# Patient Record
Sex: FEMALE
[De-identification: names, ages, dates, MRNs, and addresses within clinical notes are randomized per-mention and may not be internally consistent; named-entity substitution may affect disease eponyms.]

---

## 2023-07-07 PROBLEM — Z00.00 ENCOUNTER FOR PREVENTIVE HEALTH EXAMINATION: Status: ACTIVE | Noted: 2023-07-07

## 2023-07-14 ENCOUNTER — APPOINTMENT (OUTPATIENT)
Dept: NEUROSURGERY | Facility: CLINIC | Age: 38
End: 2023-07-14
Payer: SELF-PAY

## 2023-07-14 VITALS — BODY MASS INDEX: 30.05 KG/M2 | HEIGHT: 64 IN | WEIGHT: 176 LBS

## 2023-07-14 PROCEDURE — 99203 OFFICE O/P NEW LOW 30 MIN: CPT

## 2023-07-14 NOTE — HISTORY OF PRESENT ILLNESS
[de-identified] : Ms. Ortega is a 38yr old female who presents today for a second opinion, s/p decompression for Trigeminal neuralgia done in [facility] on 2/2021. She was first diagnosed in 2021, after the reversal of lap band surgery, she noticed pain on the right side of her face radiating from her ear to down the jaw line ( RV3). The pain is worse when eating, but can occur spontaneous, and can last hours.\par  She saw ENT doctor who referred to the dentist, and had her old teeth refill, though pain persisted. SHe was refer to neurology, and suggested pain may be due to the 5th cranial nerve, and recommended surgery. \par However after surgery, pain progressed. She tried and failed tizanidine, tegretol 200mg, baclofen, celebrex. Had a cortisone shot by a pain management provider to the 9 cranial nerves, which failed,. \par Currrently on tramadol and cymbalta which lessen the pain. \par \par

## 2023-07-14 NOTE — PLAN
[FreeTextEntry1] : I am ordering an MRI of head w/w/o contrast, FIESTA sequence, thin cuts at T2 to assess the trigeminal nerve root. She will follow up with me once it is completed so I can delineate a plan of care. She is agreeable to this. Gamma knife may be considered.

## 2023-07-23 ENCOUNTER — OUTPATIENT (OUTPATIENT)
Dept: OUTPATIENT SERVICES | Facility: HOSPITAL | Age: 38
LOS: 1 days | End: 2023-07-23
Payer: COMMERCIAL

## 2023-07-23 DIAGNOSIS — G50.0 TRIGEMINAL NEURALGIA: ICD-10-CM

## 2023-07-23 DIAGNOSIS — R42 DIZZINESS AND GIDDINESS: ICD-10-CM

## 2023-07-23 PROCEDURE — A9579: CPT

## 2023-07-23 PROCEDURE — 70553 MRI BRAIN STEM W/O & W/DYE: CPT | Mod: 26

## 2023-07-23 PROCEDURE — 70553 MRI BRAIN STEM W/O & W/DYE: CPT

## 2023-07-24 DIAGNOSIS — R42 DIZZINESS AND GIDDINESS: ICD-10-CM

## 2023-07-24 DIAGNOSIS — G50.0 TRIGEMINAL NEURALGIA: ICD-10-CM

## 2023-08-03 ENCOUNTER — APPOINTMENT (OUTPATIENT)
Dept: NEUROSURGERY | Facility: CLINIC | Age: 38
End: 2023-08-03
Payer: COMMERCIAL

## 2023-08-03 VITALS
TEMPERATURE: 97.5 F | OXYGEN SATURATION: 100 % | HEIGHT: 64 IN | WEIGHT: 176 LBS | RESPIRATION RATE: 16 BRPM | DIASTOLIC BLOOD PRESSURE: 50 MMHG | HEART RATE: 97 BPM | BODY MASS INDEX: 30.05 KG/M2 | SYSTOLIC BLOOD PRESSURE: 105 MMHG

## 2023-08-03 PROCEDURE — 99212 OFFICE O/P EST SF 10 MIN: CPT

## 2023-08-09 ENCOUNTER — APPOINTMENT (OUTPATIENT)
Dept: NEUROSURGERY | Facility: CLINIC | Age: 38
End: 2023-08-09
Payer: COMMERCIAL

## 2023-08-09 VITALS — HEIGHT: 64 IN | BODY MASS INDEX: 30.05 KG/M2 | WEIGHT: 176 LBS

## 2023-08-09 PROCEDURE — 99213 OFFICE O/P EST LOW 20 MIN: CPT

## 2023-08-15 ENCOUNTER — OUTPATIENT (OUTPATIENT)
Dept: OUTPATIENT SERVICES | Facility: HOSPITAL | Age: 38
LOS: 1 days | Discharge: HOME | End: 2023-08-15
Payer: MEDICAID

## 2023-08-21 ENCOUNTER — APPOINTMENT (OUTPATIENT)
Dept: RADIATION ONCOLOGY | Facility: CLINIC | Age: 38
End: 2023-08-21
Payer: COMMERCIAL

## 2023-08-21 DIAGNOSIS — Z87.898 PERSONAL HISTORY OF OTHER SPECIFIED CONDITIONS: ICD-10-CM

## 2023-08-21 DIAGNOSIS — Z80.1 FAMILY HISTORY OF MALIGNANT NEOPLASM OF TRACHEA, BRONCHUS AND LUNG: ICD-10-CM

## 2023-08-21 DIAGNOSIS — Z72.89 OTHER PROBLEMS RELATED TO LIFESTYLE: ICD-10-CM

## 2023-08-21 DIAGNOSIS — Z78.9 OTHER SPECIFIED HEALTH STATUS: ICD-10-CM

## 2023-08-21 DIAGNOSIS — F17.210 NICOTINE DEPENDENCE, CIGARETTES, UNCOMPLICATED: ICD-10-CM

## 2023-08-21 PROCEDURE — 99024 POSTOP FOLLOW-UP VISIT: CPT

## 2023-08-21 RX ORDER — DULOXETINE HYDROCHLORIDE 30 MG/1
30 CAPSULE, DELAYED RELEASE ORAL
Refills: 0 | Status: ACTIVE | COMMUNITY

## 2023-08-21 NOTE — HISTORY OF PRESENT ILLNESS
[FreeTextEntry1] : This is a 39 y/o F who comes to discuss the role of radiation therapy in her care. Accompanied by her nephew Zeb who she  Reports in 2020 she noticed the onset of random right facial pain from the ear to the cheek/jaw to throat. recall this occurred 2 weeks after an endoscopic removal of a gastric sleeve/lap band. Pain was described as "throbbing" aggravated by chewing/talking/touching the face. The facial pain progressively worsened despite seeking treatment from a dentist, PCP and ENT. Per patient she was treated with Tegretol, Lyrica, Baclofen with poor results for symptoms improved.  Patient  s/p MVD in [place] February 2021 for trigeminal neuralgia. Per patient the procedure seemed to have worsened her symptoms "the pain is stronger than before the surgery". She has been here since June of 2023 and has not established care with a neurologist but has undergone a neurosurgical consultation the Dr. Akers who after patient completed images recommended for RT consideration. Reports the number of episodes per day varies but she is never w/o pain despite her current regimen Tramadol/ Baclofen/Cymbalta (ordered by MD in [place]). The pain radiated from the ear to the RIGHT jaw (V2), worse with eating/eating or at times occurred at rest, which is affecting her quality of life  MRI brain w w/o contrast 7/23/2023 IMPRESSION: 1. Patient is status post right retrosigmoid craniotomy. Tiny focus of linear signal abnormality along the lateral aspect of the entry zone of the right trigeminal nerve (ser 9 im 47) likely reflecting postsurgical change. 2. Otherwise unremarkable MRI appearance of bilateral trigeminal nerves with no evidence of vascular impingement.  Denies facial weakness/numbness. Surgical incision site per patient healed but still a little numbness in the area.

## 2023-08-21 NOTE — VITALS
[Maximal Pain Intensity: 10/10] : 10/10 [Least Pain Intensity: 7/10] : 7/10 [100: Normal, no complaints, no evidence of disease.] : 100: Normal, no complaints, no evidence of disease. [Date: ____________] : Patient's last distress assessment performed on [unfilled]. [5 - Distress Level] : Distress Level: 5

## 2023-08-21 NOTE — PHYSICAL EXAM
[General Appearance - Well Developed] : well developed [de-identified] : Limited d/t visit being TEB

## 2023-08-28 ENCOUNTER — APPOINTMENT (OUTPATIENT)
Dept: NEUROSURGERY | Facility: AMBULATORY SURGERY CENTER | Age: 38
End: 2023-08-28
Payer: MEDICAID

## 2023-08-28 ENCOUNTER — OUTPATIENT (OUTPATIENT)
Dept: OUTPATIENT SERVICES | Facility: HOSPITAL | Age: 38
LOS: 1 days | End: 2023-08-28
Payer: SELF-PAY

## 2023-08-28 ENCOUNTER — OUTPATIENT (OUTPATIENT)
Dept: OUTPATIENT SERVICES | Facility: HOSPITAL | Age: 38
LOS: 1 days | End: 2023-08-28

## 2023-08-28 ENCOUNTER — APPOINTMENT (OUTPATIENT)
Dept: MRI IMAGING | Facility: IMAGING CENTER | Age: 38
End: 2023-08-28

## 2023-08-28 ENCOUNTER — APPOINTMENT (OUTPATIENT)
Dept: NEUROSURGERY | Facility: AMBULATORY SURGERY CENTER | Age: 38
End: 2023-08-28

## 2023-08-28 DIAGNOSIS — G50.0 TRIGEMINAL NEURALGIA: ICD-10-CM

## 2023-08-28 PROCEDURE — 77432 STEREOTACTIC RADIATION TRMT: CPT

## 2023-08-28 PROCEDURE — A9585: CPT

## 2023-08-28 PROCEDURE — 77295 3-D RADIOTHERAPY PLAN: CPT | Mod: 26

## 2023-08-28 PROCEDURE — 61796 SRS CRANIAL LESION SIMPLE: CPT | Mod: 1L

## 2023-08-28 PROCEDURE — 61800 APPLY SRS HEADFRAME ADD-ON: CPT | Mod: 1L

## 2023-08-28 PROCEDURE — 77300 RADIATION THERAPY DOSE PLAN: CPT | Mod: 26

## 2023-08-28 PROCEDURE — 76498 UNLISTED MR PROCEDURE: CPT

## 2023-08-28 RX ORDER — IBUPROFEN 400 MG/1
400 TABLET, FILM COATED ORAL
Qty: 0 | Refills: 0 | Status: COMPLETED | OUTPATIENT
Start: 2023-08-28

## 2023-08-28 RX ORDER — LIDOCAINE HYDROCHLORIDE 20 MG/ML
2 INJECTION, SOLUTION INFILTRATION; PERINEURAL
Qty: 0 | Refills: 0 | Status: COMPLETED | OUTPATIENT
Start: 2023-08-28

## 2023-08-28 RX ORDER — ACETAMINOPHEN 10 MG/ML
1000 INJECTION INTRAVENOUS
Qty: 0 | Refills: 0 | Status: COMPLETED | OUTPATIENT
Start: 2023-08-28

## 2023-08-28 RX ORDER — BACLOFEN 10 MG/1
10 TABLET ORAL
Refills: 0 | Status: ACTIVE | COMMUNITY

## 2023-09-01 ENCOUNTER — NON-APPOINTMENT (OUTPATIENT)
Age: 38
End: 2023-09-01

## 2023-09-08 ENCOUNTER — APPOINTMENT (OUTPATIENT)
Dept: NEUROSURGERY | Facility: CLINIC | Age: 38
End: 2023-09-08
Payer: SUBSIDIZED

## 2023-09-08 VITALS — WEIGHT: 176 LBS | BODY MASS INDEX: 30.05 KG/M2 | HEIGHT: 64 IN

## 2023-09-08 PROCEDURE — 99213 OFFICE O/P EST LOW 20 MIN: CPT

## 2023-09-08 RX ORDER — TRAMADOL HYDROCHLORIDE 50 MG/1
50 TABLET, COATED ORAL
Qty: 28 | Refills: 0 | Status: COMPLETED | COMMUNITY
Start: 2023-09-08 | End: 2023-09-22

## 2023-09-08 RX ORDER — TRAMADOL HYDROCHLORIDE 50 MG/1
50 TABLET, COATED ORAL
Refills: 0 | Status: DISCONTINUED | COMMUNITY
End: 2023-09-08

## 2023-09-08 NOTE — PLAN
[TextEntry] : Patient is going to return back to the [place] today but was educated that a MRI head with and without IV contrast is needed in 6 months post gamma knife stereotactic radiation as a follow-up, prescription provided  Tramadol refilled  Patient is to follow-up after the 6-month post GKSR MRI is performed   No concerns were identified during the visit and all questions were answered  *Dr. Cordero agreed to abovementioned plan, reviewed prior

## 2023-09-08 NOTE — HISTORY OF PRESENT ILLNESS
[FreeTextEntry1] : Ms. VILLA is a 38-year-old female presenting to the neurosurgery office today alongside 2 family members status post gamma knife stereotactic radiation performed on 8/28/2023 for right trigeminal neuralgia, follow-up.  Niece (Bret) is present and translating for the patient as per her preference. Patient speaks little English. She is overall doing well today stating that she is no longer experiencing a sharp shooting pain to the right side of her face but has residual right ear pain. She has not taken her Tramadol due to a prescribing issue since the procedure but will be sent a refill for such today, to take as needed. Patient is traveling back to [place] today. She was educated that an MRI of the head with and without IV contrast is needed in 6 months as a follow-up and a prescription was provided to the patient as per her request in the event that she is not able to [place] and she will have this performed in her home country.  Patient was advised that imaging and/or a report of the MRI be sent to our office for review after completion.  She verbalized understanding.  As for the left-sided ear pain, patient was informed that the full effects of the stereotactic radiation can take up to 3 months from treatment and at times up to 6 months, for each patient is individualized.  She should not be discouraged at this time. Patient denies any headaches, blurred vision, dizziness, one-sided weakness or gait disturbances.  She was told once more, as she was in a prior visit, that reactions to the surrounding brain can manifest up to 6 months post treatment and ultimately the patient should seek medical attention should this occur.   All 4 pin sites are unidentifiable, fully healed.  No concerns were identified today and the patient and her family were grateful for the visit.

## 2023-09-15 ENCOUNTER — NON-APPOINTMENT (OUTPATIENT)
Age: 38
End: 2023-09-15

## 2023-10-23 PROBLEM — Z00.00 ENCOUNTER FOR PREVENTIVE HEALTH EXAMINATION: Status: ACTIVE | Noted: 2023-10-23

## 2023-12-18 ENCOUNTER — NON-APPOINTMENT (OUTPATIENT)
Age: 38
End: 2023-12-18

## 2023-12-21 ENCOUNTER — APPOINTMENT (OUTPATIENT)
Dept: RADIATION ONCOLOGY | Facility: CLINIC | Age: 38
End: 2023-12-21

## 2023-12-21 ENCOUNTER — APPOINTMENT (OUTPATIENT)
Dept: RADIATION ONCOLOGY | Facility: CLINIC | Age: 38
End: 2023-12-21
Payer: SUBSIDIZED

## 2023-12-21 PROCEDURE — 99212 OFFICE O/P EST SF 10 MIN: CPT | Mod: 95

## 2023-12-21 NOTE — HISTORY OF PRESENT ILLNESS
[Home] : at home, [unfilled] , at the time of the visit. [Medical Office: ([facility])___] : at the medical office located in  [Verbal consent obtained from patient] : the patient, [unfilled] [Other Location: e.g. School (Enter Location, City,State)___] : at [unfilled], at the time of the visit. [FreeTextEntry1] : RT hx: SRS to RIGHT trigeminal nerve [id number] over 1 Fx 8/28/2023  This is a 37 y/o F who comes to discuss the role of radiation therapy in her care. Accompanied by her nephew Zeb who she  Reports in 2020 she noticed the onset of random right facial pain from the ear to the cheek/jaw to throat. recall this occurred 2 weeks after an endoscopic removal of a gastric sleeve/lap band. Pain was described as "throbbing" aggravated by chewing/talking/touching the face. The facial pain progressively worsened despite seeking treatment from a dentist, PCP and ENT. Per patient she was treated with Tegretol, Lyrica, Baclofen with poor results for symptoms improved.  Patient  s/p MVD in [place] February 2021 for trigeminal neuralgia. Per patient the procedure seemed to have worsened her symptoms "the pain is stronger than before the surgery". She has been here since June of 2023 and has not established care with a neurologist but has undergone a neurosurgical consultation the Dr. Akers who after patient completed images recommended for RT consideration. Reports the number of episodes per day varies but she is never w/o pain despite her current regimen Tramadol/ Baclofen/Cymbalta (ordered by MD in [place]). The pain radiated from the ear to the RIGHT jaw (V2), worse with eating/eating or at times occurred at rest, which is affecting her quality of life  MRI brain w w/o contrast 7/23/2023 IMPRESSION: 1. Patient is status post right retrosigmoid craniotomy. Tiny focus of linear signal abnormality along the lateral aspect of the entry zone of the right trigeminal nerve (ser 9 im 47) likely reflecting postsurgical change. 2. Otherwise unremarkable MRI appearance of bilateral trigeminal nerves with no evidence of vascular impingement.  Denies facial weakness/numbness. Surgical incision site per patient healed but still a little numbness in the area.  Visit dated 12/21/2023 Patient returns for routine f/u and progress check. Completed 86GY/1 Fx to RIGHT trigeminal nerve on 8/28/2023. Reports she received relief of RIGHT sided pain, but this was short lived reports only having symptom relief for 3 months but noticed that after been instructed to reduce the medication dose her pain recured and seemed to be worse. Tramadol, Baclofen, Cymbalta Since resuming the medication regimen her pain has improved  Denies facial numbness/weakness

## 2024-01-29 NOTE — HISTORY OF PRESENT ILLNESS
[FreeTextEntry1] : Ms. VILLA is a 38-year-old female presenting to the neurosurgery office as a follow-up for right-sided trigeminal neuralgia, niece (Bret) is present and translating for the patient as per her preference.  No recent imaging for review.  MRI Brain ([facility] 07/23/2023) 1. Patient is status post right retrosigmoid craniotomy. Tiny focus of linear signal abnormality along the lateral aspect of the entry zone of the right trigeminal nerve (ser 9 im 47) likely reflecting postsurgical change. 2. Otherwise unremarkable MRI appearance of bilateral trigeminal nerves with no evidence of vascular impingement.  Patient endorses that she has been experiencing a sharp shooting pain to the right side of her face for over 2 years. She underwent a microvascular decompression of the trigeminal nerve February 2021 back in the [place], [facility]. She had mild symptomatic relief for short period of time but since shortly after the procedure she has been on several oral agents including Tramadol and Baclofen, both of which she takes every day.   Stereotactic radiation in the form of gamma knife was presented to the patient and her family today. She was informed that the risks of cumulative effects long-term include possible manifestation from the radiation. Patient is aware that it is performed alongside Dr. Mathew in [place]. We went over the fact that side effects generally can manifest in about 3 months from treatment up to 6 months and could be delayed off that as well, for everyone is individualized. This could include reactions to the surrounding brain which may result in neurological deficits and or headaches and ultimately the patient should seek medical attention should this occur. Ms. VILLA reassured that she would not have difficulty either [place] or having the follow-up MRI post GKSR arranged back "home."  Given the risks, patient would like to proceed and will be scheduled accordingly.

## 2024-01-29 NOTE — END OF VISIT
[FreeTextEntry3] :  I, Dr Cordero personally performed the evaluation and management (E/M) services for this established patient who presents today with (a) new problem(s)/exacerbation of (an) existing condition(s).  That E/M includes conducting the examination, assessing all new/exacerbated conditions, and establishing a new plan of care.  Today, my LAXMI was here to observe my evaluation and management services for this new problem/exacerbated condition to be followed going forward.

## 2024-04-14 ENCOUNTER — RESULT REVIEW (OUTPATIENT)
Age: 39
End: 2024-04-14

## 2024-04-14 ENCOUNTER — OUTPATIENT (OUTPATIENT)
Dept: OUTPATIENT SERVICES | Facility: HOSPITAL | Age: 39
LOS: 1 days | End: 2024-04-14
Payer: COMMERCIAL

## 2024-04-14 DIAGNOSIS — Z00.8 ENCOUNTER FOR OTHER GENERAL EXAMINATION: ICD-10-CM

## 2024-04-14 DIAGNOSIS — G50.0 TRIGEMINAL NEURALGIA: ICD-10-CM

## 2024-04-14 PROCEDURE — 70553 MRI BRAIN STEM W/O & W/DYE: CPT | Mod: 26

## 2024-04-14 PROCEDURE — 70553 MRI BRAIN STEM W/O & W/DYE: CPT

## 2024-04-14 PROCEDURE — A9579: CPT

## 2024-04-15 DIAGNOSIS — G50.0 TRIGEMINAL NEURALGIA: ICD-10-CM

## 2024-04-16 ENCOUNTER — NON-APPOINTMENT (OUTPATIENT)
Age: 39
End: 2024-04-16

## 2024-04-19 ENCOUNTER — APPOINTMENT (OUTPATIENT)
Dept: NEUROSURGERY | Facility: CLINIC | Age: 39
End: 2024-04-19
Payer: COMMERCIAL

## 2024-04-19 VITALS
WEIGHT: 176 LBS | HEIGHT: 64 IN | SYSTOLIC BLOOD PRESSURE: 110 MMHG | BODY MASS INDEX: 30.05 KG/M2 | DIASTOLIC BLOOD PRESSURE: 65 MMHG

## 2024-04-19 VITALS — HEART RATE: 88 BPM

## 2024-04-19 DIAGNOSIS — G50.0 TRIGEMINAL NEURALGIA: ICD-10-CM

## 2024-04-19 DIAGNOSIS — Z92.3 PERSONAL HISTORY OF IRRADIATION: ICD-10-CM

## 2024-04-19 PROCEDURE — 99214 OFFICE O/P EST MOD 30 MIN: CPT

## 2024-04-19 RX ORDER — GABAPENTIN 100 MG/1
100 CAPSULE ORAL 3 TIMES DAILY
Qty: 90 | Refills: 0 | Status: COMPLETED | COMMUNITY
Start: 2024-04-19 | End: 2024-05-19

## 2024-04-19 RX ORDER — METHYLPREDNISOLONE 4 MG/1
4 TABLET ORAL
Qty: 1 | Refills: 0 | Status: ACTIVE | COMMUNITY
Start: 2024-04-19 | End: 1900-01-01

## 2024-04-19 RX ORDER — FAMOTIDINE 40 MG/1
40 TABLET, FILM COATED ORAL DAILY
Qty: 6 | Refills: 0 | Status: COMPLETED | COMMUNITY
Start: 2024-04-19 | End: 2024-04-25

## 2024-05-14 ENCOUNTER — APPOINTMENT (OUTPATIENT)
Dept: OTOLARYNGOLOGY | Facility: CLINIC | Age: 39
End: 2024-05-14

## 2024-06-21 NOTE — ASSESSMENT
[FreeTextEntry1] : 39y F status post GKSR on 8/28/2023. Overall doing well, complaining of more numbness to the right side of her face than pain. New MRI reviewed. Gabapentin to be started. She is to return [place] [facility], sooner if needed.  No concerns were identified during the visit and we thank her for allowing us to be a part of her care team once again.   Mikayla Osborne MS, FNP-BC Farhad Cordero MD, [facility]

## 2024-06-21 NOTE — HISTORY OF PRESENT ILLNESS
[FreeTextEntry1] : KAILYN VILLA is a 39-year-old female being seen for a follow-up visit, alongside her niece and nephew, post GKSR on 8/28/2023.  Patient endorses that she was doing well post treatment until approximately 6 weeks ago when she started to experience the right-sided facial pain again. She has intermittent headaches.  Her complaint is more numbness to the right side of her cheek and chin areas more than pain.  Pain level today 10/10. She takes Baclofen at night and Ibuprofen in the morning.  MRI 4/14/2024 identified increased rounded enhancement at the root entry zone of the right trigeminal nerve as well as patchy signal abnormality in the middle cerebellar peduncle likely reflecting postradiation changes.  It was discussed with the patient today that Gabapentin will be implemented into her medication regimen, 100mg PO TID, that she is to begin today. She is traveling back to [facility] in three days and will touch base with our office when she returns, sooner if needed.   Physical Exam: Constitutional: Well appearing, no distress HEENT: Normocephalic Atraumatic Psychiatric: Awake, alert, oriented to person, place, situation and time. Normal affect. Follows commands. Language: Speech is clear, fluent with good repetition & comprehension. No dysarthria. Pulmonary: No respiratory distress   Neurologic: CN II-XII grossly intact; EOMI with no nystagmus. No facial asymmetry bilaterally, full eye closure strength bilaterally. Hearing grossly normal. Head turning & shoulder shrug intact, bilaterally. Tongue midline, normal movements, no atrophy. Smile symmetrical. Palpation: No pain to palpation Strength: Full strength in all major muscle groups Sensation: Full sensation to light touch in all extremities, V1-V3 sensation decreased on R.  Motor: No pronator drift, muscle strength of bilateral UE and bilateral LE: 5/5. Normal muscle tone. Reflexes: DTR of biceps, knee and ankle normal 2+ biceps 2+ triceps 2+ patellar   No Clonus No Singleton's   ROM   Gait: No postural instability. Normal stance and walking without assistance.

## 2025-07-23 ENCOUNTER — APPOINTMENT (OUTPATIENT)
Dept: NEUROSURGERY | Facility: CLINIC | Age: 40
End: 2025-07-23
Payer: COMMERCIAL

## 2025-07-23 VITALS
DIASTOLIC BLOOD PRESSURE: 80 MMHG | SYSTOLIC BLOOD PRESSURE: 115 MMHG | OXYGEN SATURATION: 100 % | WEIGHT: 151 LBS | BODY MASS INDEX: 26.75 KG/M2 | HEART RATE: 99 BPM | HEIGHT: 63 IN

## 2025-07-23 DIAGNOSIS — Z92.3 PERSONAL HISTORY OF IRRADIATION: ICD-10-CM

## 2025-07-23 DIAGNOSIS — G50.0 TRIGEMINAL NEURALGIA: ICD-10-CM

## 2025-07-23 PROCEDURE — 99214 OFFICE O/P EST MOD 30 MIN: CPT
